# Patient Record
Sex: FEMALE | Race: WHITE | Employment: UNEMPLOYED
[De-identification: names, ages, dates, MRNs, and addresses within clinical notes are randomized per-mention and may not be internally consistent; named-entity substitution may affect disease eponyms.]

---

## 2018-02-18 ENCOUNTER — HEALTH MAINTENANCE LETTER (OUTPATIENT)
Age: 28
End: 2018-02-18

## 2018-12-07 ENCOUNTER — OFFICE VISIT (OUTPATIENT)
Dept: DERMATOLOGY | Facility: CLINIC | Age: 28
End: 2018-12-07
Payer: COMMERCIAL

## 2018-12-07 DIAGNOSIS — Z91.030 ALLERGY TO HONEY BEE VENOM: ICD-10-CM

## 2018-12-07 DIAGNOSIS — L50.2 HIVES DUE TO COLD AND HEAT EXPOSURE: ICD-10-CM

## 2018-12-07 DIAGNOSIS — L50.2 HIVES DUE TO COLD AND HEAT EXPOSURE: Primary | ICD-10-CM

## 2018-12-07 RX ORDER — FEXOFENADINE HCL 180 MG/1
TABLET ORAL
Qty: 50 TABLET | Refills: 1 | Status: SHIPPED | OUTPATIENT
Start: 2018-12-07

## 2018-12-07 RX ORDER — PREDNISONE 50 MG/1
TABLET ORAL
Qty: 4 TABLET | Refills: 1 | Status: SHIPPED | OUTPATIENT
Start: 2018-12-07

## 2018-12-07 ASSESSMENT — PAIN SCALES - GENERAL: PAINLEVEL: NO PAIN (0)

## 2018-12-07 NOTE — MR AVS SNAPSHOT
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)    Emergency set with 100mg Prednisone and 2 Tabl Allegra 180mg always on patient (reaction comes not very fast and patient does not want to buy expensive epipen)    Specific IgE wasp and bee    Tryptase in serum (mastocytosis)     Put patient on list to do bee/wasp venom allergy tests        Follow-up in 3-4 months          Follow-ups after your visit        Future tests that were ordered for you today     Open Future Orders        Priority Expected Expires Ordered    Tryptase Routine  12/8/2019 12/7/2018    Allergen honeybee IgE Routine  12/8/2019 12/7/2018    Allergen common wasp IgE Routine  12/8/2019 12/7/2018            Who to contact     Please call your clinic at [phone] to:    Ask questions about your health    Make or cancel appointments    Discuss your medicines    Learn about your test results    Speak to your doctor            Additional Information About Your Visit        RPO Information     RPO gives you secure access to your electronic health record. If you see a primary care provider, you can also send messages to your care team and make appointments. If you have questions, please call your primary care clinic.  If you do not have a primary care provider, please call [phone] and they will assist you.      RPO is an electronic gateway that provides easy, online access to your medical records. With RPO, you can request a clinic appointment, read your  test results, renew a prescription or communicate with your care team.     To access your existing account, please contact your [facility] or call [phone] for assistance.        Care EveryWhere ID     This is your Care EveryWhere ID. This could be used by other organizations to access your [place] medical records  [id number]         Blood Pressure from Last 3 Encounters:   12/12/16 133/75   11/11/16 106/71   10/24/16 136/76    Weight from Last 3 Encounters:   12/12/16 86.6 kg (191 lb)   11/11/16 81.6 kg (180 lb)   10/24/16 81.1 kg (178 lb 12.8 oz)                 Today's Medication Changes          These changes are accurate as of 12/7/18 12:20 PM.  If you have any questions, ask your nurse or doctor.               Start taking these medicines.        Dose/Directions    fexofenadine 180 MG tablet   Commonly known as:  ALLEGRA   Used for:  Hives due to cold and heat exposure, Allergy to honey bee venom   Started by:  Abhijit Cason MD        Can use 1 Tabl before exposing to cold (water) Use Fexofenadine 360mg as well as part of emergency set   Quantity:  50 tablet   Refills:  1       predniSONE 50 MG tablet   Commonly known as:  DELTASONE   Used for:  Hives due to cold and heat exposure, Allergy to honey bee venom   Started by:  Abhijit Cason MD        Part of emergency set: If severe allergic reaction take immediately 100mg Prednisone and 360mg Allegra.   Quantity:  4 tablet   Refills:  1            Where to get your medicines      These medications were sent to [facility] [place] [address]     Phone:  [phone]     predniSONE 50 MG tablet         Some of these will need a paper prescription and others can be bought over the counter.  Ask your nurse if you have questions.     Bring a paper prescription for each of these medications     fexofenadine 180 MG tablet                Primary Care  Provider Office Phone # Fax #    [facility] [phone] [address] [address] [address] [facility] [id number]        Equal Access to Services     OVIDIO DOLL : Servando belcher kasieo Soadrianali, waloreda luqadaha, qaybta kaalmada gemini, danica austinnargis jessica. So [facility] [phone].    ATENCIÓN: Si habla español, tiene a vernon disposición servicios gratuitos de asistencia lingüística. Llame al [phone].    We comply with applicable federal civil rights laws and [place] laws. We do not discriminate on the basis of race, color, national origin, age, disability, sex, sexual orientation, or gender identity.            Thank you!     Thank you for choosing [facility] DERMATOLOGY  for your care. Our goal is always to provide you with excellent care. Hearing back from our patients is one way we can continue to improve our services. Please take a few minutes to complete the written survey that you may receive in the mail after your visit with us. Thank you!             Your Updated Medication List - Protect others around you: Learn how to safely use, store and throw away your medicines at www.disposemymeds.org.          This list is accurate as of 12/7/18 12:20 PM.  Always use your most recent med list.                   Brand Name Dispense Instructions for use Diagnosis    EPINEPHrine 0.3 MG/0.3ML injection    EPIPEN    2 each    Inject 0.3 mLs into the muscle once as needed for anaphylaxis for 1 dose.    Sting of hornets, wasps, and bees as the cause of poisoning and toxic reactions(E905.3)       fexofenadine 180 MG tablet    ALLEGRA    50 tablet    Can use 1 Tabl before exposing to cold (water) Use Fexofenadine 360mg as well as part of emergency set    Hives due to cold and heat exposure, Allergy to honey bee venom       LEXAPRO PO      Take 5 mg by mouth daily        predniSONE 50 MG tablet    DELTASONE    4 tablet    Part of emergency set: If severe allergic reaction take immediately  100mg Prednisone and 360mg Allegra.    Hives due to cold and heat exposure, Allergy to honey bee venom       PRENATAL VITAMIN PO      Take 1 tablet by mouth daily

## 2018-12-07 NOTE — PATIENT INSTRUCTIONS
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

## 2018-12-07 NOTE — NURSING NOTE
Dermatology Rooming Note    Antonette Cerrato's goals for this visit include:   Chief Complaint   Patient presents with     Derm Problem     Antonette is here for hives.     Tiffani Fong, CMA

## 2018-12-07 NOTE — PROGRESS NOTES
[facility] Dermatology Note      Dermatology Problem List:    Specialty Problems     None          CC:   Derm Problem (Antonette is here for hives.)        Encounter Date: Dec 7, 2018    History of Present Illness:  Ms. Antonette Cerrato is a 28 year old female who presents as a referral from Self.    Birth of son in April 2017 and in October 2017 for the first time after exposure to extreme cold development of itchy, red plaques --> went to urgent care and got Epinephrin and Benadryl.  Similar problems in summer after plunging into river. 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, wasps and bees     Contraceptive management     CARDIOVASCULAR SCREENING; LDL GOAL LESS THAN 160     Encounter for supervision of normal first pregnancy in second trimester     Past Medical History:   Diagnosis Date     Bee sting allergies      Frequent UTI      To hymenoptera: severe local reaction but as well swelling on the neck with breathing problems ==> it seems it is mostly bee allergy and less wasps    Allergy History:     Allergies   Allergen Reactions     Bees Swelling     sob     Sulfa Drugs Anaphylaxis     As a child     Macrobid [Nitrofuran Derivatives] Itching       Social History:  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 Her smoking use included Cigarettes. She smoked 0.00 packs per day for 0.00 years. She has never used smokeless tobacco. She reports that she drinks alcohol. She reports that she does not use illicit drugs.      Family History:  Family History   Problem Relation Age of Onset     Heart Disease Maternal Grandmother      MI (X 2) age 53     Cardiovascular Maternal Grandmother      Depression Mother      Thyroid Disease Mother      Asthma Brother        Medications:  Current  Outpatient Prescriptions   Medication Sig Dispense Refill     EPINEPHrine (EPIPEN) 0.3 MG/0.3ML injection Inject 0.3 mLs into the muscle once as needed for anaphylaxis for 1 dose. 2 each 3     Escitalopram Oxalate (LEXAPRO PO) Take 5 mg by mouth daily       Prenatal Vit-Fe Fumarate-FA (PRENATAL VITAMIN PO) Take 1 tablet by mouth daily             Review of Systems:  -As per HPI  -Constitutional: The patient denies fatigue, fevers, chills, unintended weight loss, and night sweats.  -HEENT: Patient denies nonhealing oral sores.  -Skin: As above in HPI. No additional skin concerns.    Physical exam:  Vitals: There were no vitals taken for this visit.  GEN: This is a well developed, well-nourished female in no acute distress, in a pleasant mood.    SKIN: allergy history ==> now no skin symptoms      -No other lesions of concern on areas examined.     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 About 50% of the time was spent counseling the patient and/or coordinating care regarding their allergy.

## 2018-12-07 NOTE — LETTER
12/7/2018       RE: Antonette Cerrato  [address]  [place] [address]     Dear Colleague,    Thank you for referring your patient, Antonette Cerrato, to the [facility] DERMATOLOGY at [facility]. Please see a copy of my visit note below.    [facility] Dermatology Note      Dermatology Problem List:    Specialty Problems     None          CC:   Derm Problem (Antonette is here for hives.)        Encounter Date: Dec 7, 2018    History of Present Illness:  Ms. Antonette Cerrato is a 28 year old female who presents as a referral from Self.    Birth of son in April 2017 and in October 2017 for the first time after exposure to extreme cold development of itchy, red plaques --> went to urgent care and got Epinephrin and Benadryl.  Similar problems in summer after plunging into river. 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, wasps and bees     Contraceptive management     CARDIOVASCULAR SCREENING; LDL GOAL LESS THAN 160     Encounter for supervision of normal first pregnancy in second trimester     Past Medical History:   Diagnosis Date     Bee sting allergies      Frequent UTI      To hymenoptera: severe local reaction but as well swelling on the neck with breathing problems ==> it seems it is mostly bee allergy and less wasps    Allergy History:     Allergies   Allergen Reactions     Bees Swelling     sob     Sulfa Drugs Anaphylaxis     As a child     Macrobid [Nitrofuran Derivatives] Itching       Social History:  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 Her smoking use included Cigarettes. She smoked 0.00 packs per day for 0.00 years. She has never used smokeless tobacco. She reports that she drinks alcohol. She reports that she does not use illicit  drugs.      Family History:  Family History   Problem Relation Age of Onset     Heart Disease Maternal Grandmother      MI (X 2) age 53     Cardiovascular Maternal Grandmother      Depression Mother      Thyroid Disease Mother      Asthma Brother        Medications:  Current Outpatient Prescriptions   Medication Sig Dispense Refill     EPINEPHrine (EPIPEN) 0.3 MG/0.3ML injection Inject 0.3 mLs into the muscle once as needed for anaphylaxis for 1 dose. 2 each 3     Escitalopram Oxalate (LEXAPRO PO) Take 5 mg by mouth daily       Prenatal Vit-Fe Fumarate-FA (PRENATAL VITAMIN PO) Take 1 tablet by mouth daily       Review of Systems:  -As per HPI  -Constitutional: The patient denies fatigue, fevers, chills, unintended weight loss, and night sweats.  -HEENT: Patient denies nonhealing oral sores.  -Skin: As above in HPI. No additional skin concerns.    Physical exam:  Vitals: There were no vitals taken for this visit.  GEN: This is a well developed, well-nourished female in no acute distress, in a pleasant mood.    SKIN: allergy history ==> now no skin symptoms    -No other lesions of concern on areas examined.     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 About 50% of the time was spent counseling the patient and/or coordinating care regarding their allergy.      Abhijit Cason MD

## 2018-12-11 LAB
HONEY BEE IGE QN: <0.1 KU(A)/L
TRYPTASE SERPL-MCNC: 2.7 UG/L
YELLOW JACKET IGE QN: 0.27 KU(A)/L

## 2019-11-06 ENCOUNTER — HEALTH MAINTENANCE LETTER (OUTPATIENT)
Age: 29
End: 2019-11-06

## 2020-11-22 ENCOUNTER — HEALTH MAINTENANCE LETTER (OUTPATIENT)
Age: 30
End: 2020-11-22

## 2021-09-19 ENCOUNTER — HEALTH MAINTENANCE LETTER (OUTPATIENT)
Age: 31
End: 2021-09-19

## 2022-01-09 ENCOUNTER — HEALTH MAINTENANCE LETTER (OUTPATIENT)
Age: 32
End: 2022-01-09

## 2022-11-21 ENCOUNTER — HEALTH MAINTENANCE LETTER (OUTPATIENT)
Age: 32
End: 2022-11-21

## 2023-04-16 ENCOUNTER — HEALTH MAINTENANCE LETTER (OUTPATIENT)
Age: 33
End: 2023-04-16